# Patient Record
Sex: FEMALE | Race: WHITE | NOT HISPANIC OR LATINO | Employment: FULL TIME | URBAN - METROPOLITAN AREA
[De-identification: names, ages, dates, MRNs, and addresses within clinical notes are randomized per-mention and may not be internally consistent; named-entity substitution may affect disease eponyms.]

---

## 2024-02-22 ENCOUNTER — OFFICE VISIT (OUTPATIENT)
Dept: ALLERGY | Facility: CLINIC | Age: 24
End: 2024-02-22
Payer: COMMERCIAL

## 2024-02-22 VITALS
DIASTOLIC BLOOD PRESSURE: 80 MMHG | BODY MASS INDEX: 31.28 KG/M2 | WEIGHT: 170 LBS | RESPIRATION RATE: 17 BRPM | TEMPERATURE: 98 F | OXYGEN SATURATION: 98 % | HEIGHT: 62 IN | SYSTOLIC BLOOD PRESSURE: 118 MMHG | HEART RATE: 72 BPM

## 2024-02-22 DIAGNOSIS — J45.30 MILD PERSISTENT ASTHMA WITHOUT COMPLICATION (HHS-HCC): ICD-10-CM

## 2024-02-22 DIAGNOSIS — J30.9 ALLERGIC RHINITIS, UNSPECIFIED SEASONALITY, UNSPECIFIED TRIGGER: Primary | ICD-10-CM

## 2024-02-22 PROCEDURE — 95004 PERQ TESTS W/ALRGNC XTRCS: CPT | Performed by: ALLERGY & IMMUNOLOGY

## 2024-02-22 PROCEDURE — 94010 BREATHING CAPACITY TEST: CPT | Performed by: ALLERGY & IMMUNOLOGY

## 2024-02-22 PROCEDURE — 99204 OFFICE O/P NEW MOD 45 MIN: CPT | Performed by: ALLERGY & IMMUNOLOGY

## 2024-02-22 PROCEDURE — 95024 IQ TESTS W/ALLERGENIC XTRCS: CPT | Performed by: ALLERGY & IMMUNOLOGY

## 2024-02-22 RX ORDER — SUMATRIPTAN 50 MG/1
50 TABLET, FILM COATED ORAL ONCE AS NEEDED
COMMUNITY
Start: 2019-03-13

## 2024-02-22 RX ORDER — LISDEXAMFETAMINE DIMESYLATE 50 MG/1
50 CAPSULE ORAL DAILY
COMMUNITY
Start: 2023-11-21

## 2024-02-22 RX ORDER — FLUTICASONE PROPIONATE 50 UG/1
1 POWDER, METERED RESPIRATORY (INHALATION)
COMMUNITY

## 2024-02-22 RX ORDER — FLUTICASONE PROPIONATE 50 MCG
2 SPRAY, SUSPENSION (ML) NASAL 2 TIMES DAILY
COMMUNITY
Start: 2022-12-02

## 2024-02-22 RX ORDER — DESOGESTREL AND ETHINYL ESTRADIOL 0.15-0.03
1 KIT ORAL DAILY
COMMUNITY

## 2024-02-22 RX ORDER — DEXTROAMPHETAMINE SACCHARATE, AMPHETAMINE ASPARTATE MONOHYDRATE, DEXTROAMPHETAMINE SULFATE AND AMPHETAMINE SULFATE 5; 5; 5; 5 MG/1; MG/1; MG/1; MG/1
20 CAPSULE, EXTENDED RELEASE ORAL EVERY MORNING
COMMUNITY
Start: 2024-02-04

## 2024-02-22 RX ORDER — CETIRIZINE HYDROCHLORIDE 10 MG/1
10 TABLET ORAL AS NEEDED
COMMUNITY

## 2024-02-22 RX ORDER — TRETINOIN 0.5 MG/G
CREAM TOPICAL NIGHTLY
COMMUNITY
Start: 2018-07-26

## 2024-02-22 NOTE — PROGRESS NOTES
"Patient ID: Lorena Del Real is a 23 y.o. female.     Chief Complaint: re-establish  History Of Present Illness  Lorena Del Real is a 23 y.o. female with PMx allergic rhinitis presenting for repeat evaluation. Patient was last seen 8/15/2019. Previously on immunotherapy for dust mite, pets, and pollen.  She has a history of mild persistent asthma.    Food Allergy  No    Eczema/ Atopic Dermatitis  No    Asthma  Mild persistent  Current medications: daily Arnuity, Albuterol as needed.  Steps up to higher dose Flovent if she is having increased asthma issues.  Spirometry-normal    Rhinoconjunctivitis  Previously on immunotherapy 5 yrs ago. Did not do a complete course.  Would like to consider restart.  Current medications: Benadryl or Zyrtec as needed.  She last took 2 wks ago when she was around dogs.    Drug Allergy   PCN and contrast media    Insect Allergy   No    Infections  No history of frequent or recurrent infections      Review of Systems    Pertinent positives and negatives have been assessed in the HPI. All other systems have been reviewed and are negative except as noted in the HPI.    Allergies  Iodinated contrast media, Penicillins, Gadolinium-containing contrast media, and Pollen extracts    Past Medical History  She had uti sepsis while in [place] in school.    Family History  No family history on file.    No history of food allergy or atopic disease in the family.    Surgical History  She had an appendectomy while in school in [facility]    Social/Environmental History  She has no history on file for tobacco use, alcohol use, and drug use.    Home: Lives in a house   Floors: Wood and carpeting mixed  Air Conditioning: Central  Smoker: No  Pets: No  Infestations: No  Molds: No  Occupation:     MEDICATIONS      Physical Exam  Visit Vitals  /80   Pulse 72   Temp 36.7 °C (98 °F) (Temporal)   Resp 17   Ht 1.575 m (5' 2\")   Wt 77.1 kg (170 lb)   SpO2 98%   BMI 31.09 kg/m²   BSA 1.84 m²       Wt " Readings from Last 1 Encounters:   02/22/24 77.1 kg (170 lb)       Physical Exam    General: Well appearing, no acute distress  Head: Normocephalic, atraumatic, neck supple without lymphadenopathy  Eyes: EOMI, non-injected  Nose: No nasal crease, nares patent, slightly boggy turbinates, minimal discharge  Throat: Normal dentition, no erythema  Heart: Regular rate and rhythm  Lungs: Clear to auscultation bilaterally, effort normal  Abdomen: Soft, non-tender, normal bowel sounds  Extremities: Moves all extremities symmetrically, no edema  Skin: No rashes/lesions  Psych: normal mood and affect      CMP:  Recent Labs     04/06/19  0829      K 4.5      CO2 24   ANIONGAP 15   BUN 9   CREATININE 0.84     HEME/ENDO:  Recent Labs     07/09/22  0816 12/09/21  0821 04/02/21  1600 09/12/19  0955 04/06/19  0829   TSH  --   --   --   --  0.76   HGBA1C 5.3 4.7 4.9   < >  --     < > = values in this interval not displayed.     Assessment/Plan   Assessment:  Allergy to cat, dog, horse, dust mite, tree pollen, grass pollen  Mild persistent asthma controlled with normal spirometry.    Plan:  Review of allergy skin tests with patient.  Medications and avoidance discussed.     Continue current asthma medications and consider allergy immunotherapy    Follow up 4 months        Lily Noble DO

## 2024-02-22 NOTE — PATIENT INSTRUCTIONS
"Allergy to cat, dog, horse, dust mite tree pollen, grass pollen and ragweed. No mold allergy.    For dust mite allergy, be sure to wash your bedding, including your sheets, weekly in hot water, in order to reduce dust mites.  Encase your pillow and mattress in a hypoallergenic or anti-dust mite case.      You may consider a HEPA filter if you do not have one, and if possible, put it in the patient's bedroom.      If you have pets, avoid having them in the bedroom if possible.  Regular grooming and wiped the pet with a pet allergy wipe will help with dander and allergens on the pet's fur.  Vacuum regularly.    Keep humidity in the home 30-50% to decrease mold growth.  Clean bathrooms with a bleach solution and fix any leaking faucets.  You may also wipe down windowsills with  bleach solution.    For pollen allergy, keep windows closed and use central air.  You can consider wearing a hat and sunglasses as well to reduce pollen exposure.  After being outside, wash hands and face or shower to reduce the pollen on your body.  Wash clothing after wearing outside during the pollen seasons.       Allergy Shots     Allergen immunotherapy injections or \"allergy shots\" are prescribed for patients with allergic rhinitis (hay fever), allergic asthma or life threatening reactions to insect stings.  Immunotherapy is the only medical treatment that could potentially modify allergic disease.  Some studies have shown that it may have a preventive role in allergic children, possibly preventing asthma from developing in some patients with allergic rhinitis.  Immunotherapy would be considered for individuals, who have moderate or severe symptoms not adequately controlled by environmental control measures and/or medications.     Effectiveness    Allergen immunotherapy (allergy shots) may \"turn down\" allergic reactions to common allergens including pollens, molds, animal dander and dust mites.  In most cases, the initial 6 to 12 month " course of allergy shots is likely to gradually decrease sensitivity to airborne allergens and continuation of injections leads to further improvement.  The injections diminish sensitivities, resulting in fewer symptoms and use of fewer medications.  It is important to maintain shots at the proper time interval; missing your shots for a short time may be acceptable but an appropriate adjustment in the dose of vaccine may be necessary for long lapses in injections.  Please see us if you miss receiving your injections for longer than what is recommended for your current vial.     How long are shots given?     There are generally two phases to immunotherapy:  a build-up phase and a maintenance phase    Build-up phase: involves receiving injections with increasing amounts of the allergens.  The frequency of injections during this phase generally ranges from 1 to 2 times a week, though more rapid build-up schedules are sometimes used.  The duration of this phase depends on the frequency of the injections but generally ranges from 3 to 6 months (at a frequency of 2 times and 1 time a week, respectively).     Maintenance phase: This phase begins when the effective therapeutic dose is reached.  The effective therapeutic dose is based on recommendations from a national collaborative committee called the Joint Task Force for Practice Parameters: Allergen immunotherapy: A Practice Parameter and was determined after review of a number of published studies on immunotherapy.  The effective maintenance dose may be individualized for a particular person based on their degree of sensitivity (how ' allergic they are' to the allergens in their vaccine) and their response to the immunotherapy build-up phase.  Once the target maintenance dose is reached, the intervals between the allergy injections can be increased.  The intervals between maintenance immunotherapy injections generally ranges from every 2 to every 4 weeks but should be  individualized to provide the best combination of effectiveness and safety for each person.  Lyon intervals between allergy injections may lead to fewer reactions an greater benefit in some people and some individuals may tolerate intervals longer than four weeks between injections.     Reactions to allergy injections    It is possible to have an allergic reaction to the allergy injection itself.  Reactions can be local (swelling at the injection site) or systemic (affecting the rest of the body).  Systemic reactions include hay fever type symptoms, hives, flushing, lightheadedness, and/or asthma, and rarely, life threatening reactions.   Some condition can make allergic reactions to the injections more likely: heavy natural exposure to pollen during a pollen season and exercise after an injection.  Serious systemic reactions can occur in patients with asthma that has worsened and is not well controlled on recommended medications.  Therefore, if you have noted worsening of your asthma symptoms, notify your nurse or physician before receiving your scheduled injections!  Reactions to injections can occur, however, even in the absence of these conditions.      Please inform the nursing staff if you have been diagnosed with a new medical condition or prescribed any new medications since your last visit.  If any symptoms occur immediately or within hours of your injection, please inform the nurse before you receive your next injection.                   CHARGES    1. Charges for vaccine depend on the concentration and number of vials.  2. All vaccine must be paid for at the time it is ordered.  3. If we have a contract with your insurance plan, we will submit the claim.  4. If your vaccine is covered under a prescription plan, you will need to pay for your vaccine up front.  Please obtain a form from your employer and we will fill it out and submit it to your prescription plan.  5. Any co-payments or deductibles not  covered by your insurance company are your responsibility.  Some insurance plans do not cover allergy vaccine.  Please check with your insurance company about coverage before starting vaccine therapy.  6. If you ask us to make your vaccine without proper insurance authorization or you are not sure whether you are going to start allergy injections you will be responsible for the cost.    OUR RESPONSIBILITY    It is our responsibility to be sure you are receiving the correct vaccine and the correct dosage.  We will treat any problems that may arise from your allergy injections.  We will answer concerns you may have about your individual course of treatment.  Our office is available to answer questions you may have regarding your allergy injections.  Please call us if you have any concerns, but for any emergencies, please call [phone].    YOUR RESPONSIBILITY    1. You need to let us know if you have any difficulty with your injections, including a local skin reaction to the injection, worsening of your allergy symptoms after an injection or signs of a systemic reaction.  2. Do not get your allergy shot if you are ill.  If you have a temperature or coughing, wheezing or experiencing shortness of breath up to forty-eight hours before your injection, you will not receive your allergy injection.  Please call us before you come in and we can let you know whether you should postpone your injection.  3. Do not participate in excessive activity/exercise for two to three hours after your injection.  This may increase absorption of the injection and may lead to an adverse reaction.  4. You are required to wait thirty minutes after your injection.  There are no exceptions to this rule.  If you are unable to stay the thirty minutes, please reschedule your injection appointment.    5. You must have your injection site checked by the medical assistant or nurse prior to leaving the office.  6. If you experience any symptoms that make you  think you are having a reaction to the injections, please let us know immediately.  Do not wait thirty minutes to let us know.  7. Please make us aware of any changes in your medications, especially if related to blood pressure, migraine headaches or heart conditions.